# Patient Record
Sex: FEMALE | Race: AMERICAN INDIAN OR ALASKA NATIVE
[De-identification: names, ages, dates, MRNs, and addresses within clinical notes are randomized per-mention and may not be internally consistent; named-entity substitution may affect disease eponyms.]

---

## 2019-12-15 ENCOUNTER — HOSPITAL ENCOUNTER (EMERGENCY)
Dept: HOSPITAL 5 - ED | Age: 39
Discharge: HOME | End: 2019-12-15
Payer: MEDICARE

## 2019-12-15 VITALS — DIASTOLIC BLOOD PRESSURE: 62 MMHG | SYSTOLIC BLOOD PRESSURE: 108 MMHG

## 2019-12-15 DIAGNOSIS — Z88.0: ICD-10-CM

## 2019-12-15 DIAGNOSIS — R56.9: Primary | ICD-10-CM

## 2019-12-15 DIAGNOSIS — Z90.710: ICD-10-CM

## 2019-12-15 DIAGNOSIS — K50.90: ICD-10-CM

## 2019-12-15 DIAGNOSIS — F17.200: ICD-10-CM

## 2019-12-15 DIAGNOSIS — Z79.899: ICD-10-CM

## 2019-12-15 DIAGNOSIS — Z90.89: ICD-10-CM

## 2019-12-15 LAB
ALBUMIN SERPL-MCNC: 4.3 G/DL (ref 3.9–5)
ALT SERPL-CCNC: 8 UNITS/L (ref 7–56)
BASOPHILS # (AUTO): 0.1 K/MM3 (ref 0–0.1)
BASOPHILS NFR BLD AUTO: 0.5 % (ref 0–1.8)
BENZODIAZEPINES SCREEN,URINE: (no result)
BILIRUB DIRECT SERPL-MCNC: < 0.2 MG/DL (ref 0–0.2)
BILIRUB UR QL STRIP: (no result)
BLOOD UR QL VISUAL: (no result)
BUN SERPL-MCNC: 9 MG/DL (ref 7–17)
BUN/CREAT SERPL: 15 %
CALCIUM SERPL-MCNC: 9 MG/DL (ref 8.4–10.2)
EOSINOPHIL # BLD AUTO: 0.1 K/MM3 (ref 0–0.4)
EOSINOPHIL NFR BLD AUTO: 0.5 % (ref 0–4.3)
HCT VFR BLD CALC: 45.3 % (ref 30.3–42.9)
HEMOLYSIS INDEX: 5
HGB BLD-MCNC: 15.4 GM/DL (ref 10.1–14.3)
LYMPHOCYTES # BLD AUTO: 1.6 K/MM3 (ref 1.2–5.4)
LYMPHOCYTES NFR BLD AUTO: 12.8 % (ref 13.4–35)
MCHC RBC AUTO-ENTMCNC: 34 % (ref 30–34)
MCV RBC AUTO: 93 FL (ref 79–97)
METHADONE SCREEN,URINE: (no result)
MONOCYTES # (AUTO): 0.5 K/MM3 (ref 0–0.8)
MONOCYTES % (AUTO): 4 % (ref 0–7.3)
MUCOUS THREADS #/AREA URNS HPF: (no result) /HPF
OPIATE SCREEN,URINE: (no result)
PH UR STRIP: 7 [PH] (ref 5–7)
PLATELET # BLD: 250 K/MM3 (ref 140–440)
PROT UR STRIP-MCNC: (no result) MG/DL
RBC # BLD AUTO: 4.85 M/MM3 (ref 3.65–5.03)
RBC #/AREA URNS HPF: 5 /HPF (ref 0–6)
UROBILINOGEN UR-MCNC: < 2 MG/DL (ref ?–2)
WBC #/AREA URNS HPF: < 1 /HPF (ref 0–6)

## 2019-12-15 PROCEDURE — 36415 COLL VENOUS BLD VENIPUNCTURE: CPT

## 2019-12-15 PROCEDURE — 80185 ASSAY OF PHENYTOIN TOTAL: CPT

## 2019-12-15 PROCEDURE — 80076 HEPATIC FUNCTION PANEL: CPT

## 2019-12-15 PROCEDURE — 81001 URINALYSIS AUTO W/SCOPE: CPT

## 2019-12-15 PROCEDURE — 80307 DRUG TEST PRSMV CHEM ANLYZR: CPT

## 2019-12-15 PROCEDURE — 85025 COMPLETE CBC W/AUTO DIFF WBC: CPT

## 2019-12-15 PROCEDURE — 80048 BASIC METABOLIC PNL TOTAL CA: CPT

## 2019-12-15 NOTE — EMERGENCY DEPARTMENT REPORT
ED Seizure HPI





- General


Chief Complaint: Seizure


Stated Complaint: SEIZURE


Time Seen by Provider: 12/15/19 04:45


Source: patient, family


Mode of arrival: Ambulatory


Limitations: No Limitations





- History of Present Illness


Initial Comments: 





Patient is 39 years old female with history of seizure and cross disease.  

Patient presented to the ER complaining of one episode of seizure, described as 

grand mal seizure.  Patient stated that this is similar to what he had before.  

Patient stated that she is taking Dilantin and Ativan 0.5 mg 3 times a day and 

Tegretol.  Patient denied a head injury, weakness numbness or tingling 

sensation.


MD Complaint: seizure


-: Sudden


Description of Episode: loss of consciousness, tonic-clonic movement


Witnessed:: Yes


Trauma: No


Seizure History: known seizure disorder


Place: street/outdoors


Possible Precipitating Event: none


Associated Symptoms: denies other symptoms


Treatments Prior to Arrival: none





- Related Data


                                Home Medications











 Medication  Instructions  Recorded  Confirmed  Last Taken


 


Gabapentin [Neurontin] 800 mg PO TID 12/15/19 12/15/19 12/14/19


 


LORazepam [Ativan] 0.5 mg PO TID 12/15/19 12/15/19 12/08/19


 


OLANZapine [Zyprexa] 20 mg PO DAILY 12/15/19 12/15/19 12/14/19


 


OXcarbazepine [Trileptal] 150 mg PO BID 12/15/19 12/15/19 12/14/19


 


Ondansetron [Zofran ODT TAB] 8 mg PO Q8HR PRN 12/15/19 12/15/19 Unknown


 


Phenytoin [Dilantin] 100 mg PO Q8HR 12/15/19 12/15/19 12/15/19


 


Venlafaxine [Effexor] 150 mg PO BID 12/15/19 12/15/19 12/14/19











                                    Allergies











Allergy/AdvReac Type Severity Reaction Status Date / Time


 


Penicillins Allergy  Anaphylaxis Verified 12/15/19 03:22














ED Review of Systems


ROS: 


Stated complaint: SEIZURE


Other details as noted in HPI





Comment: All other systems reviewed and negative


Constitutional: chills, fever


Eyes: denies: eye pain


Respiratory: denies: cough, orthopnea, shortness of breath, SOB with exertion, 

SOB at rest, wheezing


Cardiovascular: denies: chest pain


Gastrointestinal: nausea, vomiting, diarrhea.  denies: abdominal pain, 

constipation, hematemesis, melena, hematochezia


Musculoskeletal: denies: back pain


Neurological: denies: headache, weakness, numbness, paresthesias, confusion, 

abnormal gait





ED Past Medical Hx





- Past Medical History


Previous Medical History?: Yes


Hx Seizures: Yes


Additional medical history: chrons





- Surgical History


Past Surgical History?: Yes


Additional Surgical History: partial hysterectomy,  tonsilectomy





- Social History


Smoking Status: Current Every Day Smoker


Substance Use Type: None





- Medications


Home Medications: 


                                Home Medications











 Medication  Instructions  Recorded  Confirmed  Last Taken  Type


 


Gabapentin [Neurontin] 800 mg PO TID 12/15/19 12/15/19 12/14/19 History


 


LORazepam [Ativan] 0.5 mg PO TID 12/15/19 12/15/19 12/08/19 History


 


OLANZapine [Zyprexa] 20 mg PO DAILY 12/15/19 12/15/19 12/14/19 History


 


OXcarbazepine [Trileptal] 150 mg PO BID 12/15/19 12/15/19 12/14/19 History


 


Ondansetron [Zofran ODT TAB] 8 mg PO Q8HR PRN 12/15/19 12/15/19 Unknown History


 


Phenytoin [Dilantin] 100 mg PO Q8HR 12/15/19 12/15/19 12/15/19 History


 


Venlafaxine [Effexor] 150 mg PO BID 12/15/19 12/15/19 12/14/19 History














ED Physical Exam





- General


Limitations: No Limitations


General appearance: alert, in no apparent distress





- Head


Head exam: Present: atraumatic, normocephalic, normal inspection





- Eye


Eye exam: Present: normal appearance, PERRL





- ENT


ENT exam: Present: normal exam, normal orophraynx, mucous membranes moist





- Neck


Neck exam: Present: normal inspection, full ROM.  Absent: tenderness, 

meningismus, lymphadenopathy, thyromegaly





- Respiratory


Respiratory exam: Present: normal lung sounds bilaterally





- Cardiovascular


Cardiovascular Exam: Present: regular rate, normal rhythm, normal heart sounds





- GI/Abdominal


GI/Abdominal exam: Present: soft, normal bowel sounds.  Absent: distended, 

tenderness, guarding, rebound, rigid, organomegaly, mass, bruit, pulsatile mass,

 hernia





- Extremities Exam


Extremities exam: Present: normal inspection, full ROM, normal capillary refill.

  Absent: tenderness, pedal edema, calf tenderness





- Back Exam


Back exam: Present: normal inspection.  Absent: tenderness, CVA tenderness (R), 

CVA tenderness (L), muscle spasm, paraspinal tenderness, vertebral tenderness





- Neurological Exam


Neurological exam: Present: alert, oriented X3, CN II-XII intact, normal gait, 

reflexes normal





- Skin


Skin exam: Present: warm, intact, normal color





ED Course


                                   Vital Signs











  12/15/19 12/15/19 12/15/19





  03:18 05:02 05:05


 


Temperature 101.0 F H  


 


Pulse Rate 92 H  


 


Respiratory 18 20 20





Rate   


 


Blood Pressure 114/48  


 


Blood Pressure   





[Left]   


 


O2 Sat by Pulse 95  99





Oximetry   














  12/15/19





  05:26


 


Temperature 98.5 F


 


Pulse Rate 64


 


Respiratory 20





Rate 


 


Blood Pressure 


 


Blood Pressure 104/46





[Left] 


 


O2 Sat by Pulse 97





Oximetry 














ED Medical Decision Making





- Lab Data


Result diagrams: 


                                 12/15/19 05:07








- Medical Decision Making





Patient is 39 years old female with history of seizure and cross disease.  

Patient presented to the ER complaining of one episode of seizure, described as 

grand mal seizure.  Patient stated that this is similar to what he had before.  

Patient stated that she is taking Dilantin and Ativan 0.5 mg 3 times a day and 

Tegretol.  Patient denied a head injury, weakness numbness or tingling 

sensation.


Critical care attestation.: 


If time is entered above; I have spent that time in minutes in the direct care o

f this critically ill patient, excluding procedure time.








ED Disposition


Clinical Impression: 


 Seizure





Disposition: DC-01 TO HOME OR SELFCARE


Is pt being admited?: No


Condition: Stable


Instructions:  Recurrent Seizures Adult (ED)


Referrals: 


PRIMARY CARE,MD [Primary Care Provider] - 3-5 Days